# Patient Record
Sex: FEMALE | Race: WHITE | URBAN - METROPOLITAN AREA
[De-identification: names, ages, dates, MRNs, and addresses within clinical notes are randomized per-mention and may not be internally consistent; named-entity substitution may affect disease eponyms.]

---

## 2020-10-21 LAB
ABS NEUT (OLG): 7.48 X10(3)/MCL (ref 2.1–9.2)
ALBUMIN SERPL-MCNC: 2.7 GM/DL (ref 3.4–4.8)
ALBUMIN/GLOB SERPL: 1.1 RATIO (ref 1.1–2)
ALP SERPL-CCNC: 104 UNIT/L (ref 40–150)
ALT SERPL-CCNC: 108 UNIT/L (ref 0–55)
AST SERPL-CCNC: 54 UNIT/L (ref 5–34)
BASOPHILS # BLD AUTO: 0.01 X10(3)/MCL (ref 0–0.2)
BASOPHILS NFR BLD AUTO: 0.1 % (ref 0–1)
BILIRUB SERPL-MCNC: 2.6 MG/DL (ref 0.2–1.2)
BILIRUBIN DIRECT+TOT PNL SERPL-MCNC: 1 MG/DL (ref 0–0.8)
BILIRUBIN DIRECT+TOT PNL SERPL-MCNC: 1.6 MG/DL (ref 0–0.5)
BUN SERPL-MCNC: 78.7 MG/DL (ref 9.8–20.1)
CALCIUM SERPL-MCNC: 8.1 MG/DL (ref 8.4–10.2)
CHLORIDE SERPL-SCNC: 107 MMOL/L (ref 98–107)
CO2 SERPL-SCNC: 26 MMOL/L (ref 23–31)
CREAT SERPL-MCNC: 1.95 MG/DL (ref 0.57–1.11)
CRP SERPL HS-MCNC: 5.76 MG/L (ref 0–5)
EOSINOPHIL # BLD AUTO: 0.18 X10(3)/MCL (ref 0–0.9)
EOSINOPHIL NFR BLD AUTO: 2 % (ref 0–6.4)
ERYTHROCYTE [DISTWIDTH] IN BLOOD BY AUTOMATED COUNT: 17.8 % (ref 11.5–17)
ERYTHROCYTE [SEDIMENTATION RATE] IN BLOOD: 17 MM/HR (ref 0–30)
FERRITIN SERPL-MCNC: 120.33 NG/ML (ref 4.63–204)
GLOBULIN SER-MCNC: 2.4 GM/DL (ref 2.4–3.5)
GLUCOSE SERPL-MCNC: 195 MG/DL (ref 82–115)
HCT VFR BLD AUTO: 29.7 % (ref 37–47)
HGB BLD-MCNC: 9.4 GM/DL (ref 12–16)
IMM GRANULOCYTES # BLD AUTO: 0.15 10*3/UL (ref 0–0.02)
IMM GRANULOCYTES NFR BLD AUTO: 1.6 % (ref 0–0.43)
IRON SATN MFR SERPL: 12 % (ref 20–50)
IRON SERPL-MCNC: 40 UG/DL (ref 50–170)
LYMPHOCYTES # BLD AUTO: 0.55 X10(3)/MCL (ref 0.6–4.6)
LYMPHOCYTES NFR BLD AUTO: 6 % (ref 16–44)
MAGNESIUM SERPL-MCNC: 2.17 MG/DL (ref 1.6–2.6)
MCH RBC QN AUTO: 28.7 PG (ref 27–31)
MCHC RBC AUTO-ENTMCNC: 31.6 GM/DL (ref 33–36)
MCV RBC AUTO: 90.8 FL (ref 80–94)
MONOCYTES # BLD AUTO: 0.86 X10(3)/MCL (ref 0.1–1.3)
MONOCYTES NFR BLD AUTO: 9.3 % (ref 4–12.1)
NEUTROPHILS # BLD AUTO: 7.48 X10(3)/MCL (ref 2.1–9.2)
NEUTROPHILS NFR BLD AUTO: 81 % (ref 43–73)
NRBC BLD AUTO-RTO: 0 % (ref 0–0.2)
PHOSPHATE SERPL-MCNC: 3.3 MG/DL (ref 2.3–4.7)
PLATELET # BLD AUTO: 199 X10(3)/MCL (ref 130–400)
PMV BLD AUTO: 10.2 FL (ref 7.4–10.4)
POTASSIUM SERPL-SCNC: 3.9 MMOL/L (ref 3.5–5.1)
PREALB SERPL-MCNC: 16 MG/DL (ref 14–37)
PROT SERPL-MCNC: 5.1 GM/DL (ref 5.8–7.6)
RBC # BLD AUTO: 3.27 X10(6)/MCL (ref 4.2–5.4)
SODIUM SERPL-SCNC: 142 MMOL/L (ref 136–145)
TIBC SERPL-MCNC: 284 UG/DL (ref 70–310)
TIBC SERPL-MCNC: 324 UG/DL (ref 250–450)
TRANSFERRIN SERPL-MCNC: 297 MG/DL
WBC # SPEC AUTO: 9.2 X10(3)/MCL (ref 4.5–11.5)

## 2020-10-23 LAB
BUN SERPL-MCNC: 64.3 MG/DL (ref 9.8–20.1)
CALCIUM SERPL-MCNC: 8.7 MG/DL (ref 8.4–10.2)
CHLORIDE SERPL-SCNC: 107 MMOL/L (ref 98–107)
CO2 SERPL-SCNC: 29 MMOL/L (ref 23–31)
CREAT SERPL-MCNC: 1.52 MG/DL (ref 0.57–1.11)
CREAT/UREA NIT SERPL: 42
GLUCOSE SERPL-MCNC: 234 MG/DL (ref 82–115)
POTASSIUM SERPL-SCNC: 3.7 MMOL/L (ref 3.5–5.1)
SODIUM SERPL-SCNC: 145 MMOL/L (ref 136–145)

## 2020-10-25 LAB
BUN SERPL-MCNC: 53 MG/DL (ref 9.8–20.1)
CALCIUM SERPL-MCNC: 8.9 MG/DL (ref 8.4–10.2)
CHLORIDE SERPL-SCNC: 102 MMOL/L (ref 98–107)
CO2 SERPL-SCNC: 36 MMOL/L (ref 23–31)
CREAT SERPL-MCNC: 1.31 MG/DL (ref 0.57–1.11)
CREAT/UREA NIT SERPL: 40
EST. AVERAGE GLUCOSE BLD GHB EST-MCNC: 139.8 MG/DL
GLUCOSE SERPL-MCNC: 162 MG/DL (ref 82–115)
HBA1C MFR BLD: 6.5 %
MAGNESIUM SERPL-MCNC: 2.09 MG/DL (ref 1.6–2.6)
POTASSIUM SERPL-SCNC: 3.5 MMOL/L (ref 3.5–5.1)
SODIUM SERPL-SCNC: 147 MMOL/L (ref 136–145)

## 2020-10-26 LAB
ABS NEUT (OLG): 5.65 X10(3)/MCL (ref 2.1–9.2)
ALBUMIN SERPL-MCNC: 2.5 GM/DL (ref 3.4–4.8)
ALBUMIN/GLOB SERPL: 1 RATIO (ref 1.1–2)
ALP SERPL-CCNC: 206 UNIT/L (ref 40–150)
ALT SERPL-CCNC: 65 UNIT/L (ref 0–55)
AST SERPL-CCNC: 47 UNIT/L (ref 5–34)
BASOPHILS # BLD AUTO: 0.01 X10(3)/MCL (ref 0–0.2)
BASOPHILS NFR BLD AUTO: 0.1 % (ref 0–1)
BILIRUB SERPL-MCNC: 2.2 MG/DL (ref 0.2–1.2)
BILIRUBIN DIRECT+TOT PNL SERPL-MCNC: 0.9 MG/DL (ref 0–0.8)
BILIRUBIN DIRECT+TOT PNL SERPL-MCNC: 1.3 MG/DL (ref 0–0.5)
BUN SERPL-MCNC: 53.5 MG/DL (ref 9.8–20.1)
CALCIUM SERPL-MCNC: 8.7 MG/DL (ref 8.4–10.2)
CHLORIDE SERPL-SCNC: 102 MMOL/L (ref 98–107)
CO2 SERPL-SCNC: 34 MMOL/L (ref 23–31)
CREAT SERPL-MCNC: 1.23 MG/DL (ref 0.57–1.11)
CRP SERPL HS-MCNC: 5.74 MG/L (ref 0–5)
EOSINOPHIL # BLD AUTO: 0.18 X10(3)/MCL (ref 0–0.9)
EOSINOPHIL NFR BLD AUTO: 2.5 % (ref 0–6.4)
ERYTHROCYTE [DISTWIDTH] IN BLOOD BY AUTOMATED COUNT: 18.6 % (ref 11.5–17)
ERYTHROCYTE [SEDIMENTATION RATE] IN BLOOD: 22 MM/HR (ref 0–30)
GLOBULIN SER-MCNC: 2.6 GM/DL (ref 2.4–3.5)
GLUCOSE SERPL-MCNC: 172 MG/DL (ref 82–115)
HCT VFR BLD AUTO: 31.7 % (ref 37–47)
HGB BLD-MCNC: 9.7 GM/DL (ref 12–16)
IMM GRANULOCYTES # BLD AUTO: 0.13 10*3/UL (ref 0–0.02)
IMM GRANULOCYTES NFR BLD AUTO: 1.8 % (ref 0–0.43)
LYMPHOCYTES # BLD AUTO: 0.53 X10(3)/MCL (ref 0.6–4.6)
LYMPHOCYTES NFR BLD AUTO: 7.4 % (ref 16–44)
MAGNESIUM SERPL-MCNC: 2.01 MG/DL (ref 1.6–2.6)
MCH RBC QN AUTO: 29.2 PG (ref 27–31)
MCHC RBC AUTO-ENTMCNC: 30.6 GM/DL (ref 33–36)
MCV RBC AUTO: 95.5 FL (ref 80–94)
MONOCYTES # BLD AUTO: 0.69 X10(3)/MCL (ref 0.1–1.3)
MONOCYTES NFR BLD AUTO: 9.6 % (ref 4–12.1)
NEUTROPHILS # BLD AUTO: 5.65 X10(3)/MCL (ref 2.1–9.2)
NEUTROPHILS NFR BLD AUTO: 78.6 % (ref 43–73)
NRBC BLD AUTO-RTO: 0 % (ref 0–0.2)
PHOSPHATE SERPL-MCNC: 3.4 MG/DL (ref 2.3–4.7)
PLATELET # BLD AUTO: 263 X10(3)/MCL (ref 130–400)
PMV BLD AUTO: 11.2 FL (ref 7.4–10.4)
POTASSIUM SERPL-SCNC: 3.8 MMOL/L (ref 3.5–5.1)
PREALB SERPL-MCNC: 16.8 MG/DL (ref 14–37)
PROT SERPL-MCNC: 5.1 GM/DL (ref 5.8–7.6)
RBC # BLD AUTO: 3.32 X10(6)/MCL (ref 4.2–5.4)
SODIUM SERPL-SCNC: 145 MMOL/L (ref 136–145)
WBC # SPEC AUTO: 7.2 X10(3)/MCL (ref 4.5–11.5)

## 2020-10-27 LAB
BUN SERPL-MCNC: 50.3 MG/DL (ref 9.8–20.1)
CALCIUM SERPL-MCNC: 9 MG/DL (ref 8.4–10.2)
CHLORIDE SERPL-SCNC: 104 MMOL/L (ref 98–107)
CO2 SERPL-SCNC: 33 MMOL/L (ref 23–31)
CREAT SERPL-MCNC: 1.18 MG/DL (ref 0.57–1.11)
CREAT/UREA NIT SERPL: 43
GLUCOSE SERPL-MCNC: 176 MG/DL (ref 82–115)
POTASSIUM SERPL-SCNC: 4.3 MMOL/L (ref 3.5–5.1)
SODIUM SERPL-SCNC: 144 MMOL/L (ref 136–145)

## 2020-10-28 LAB
BUN SERPL-MCNC: 53.6 MG/DL (ref 9.8–20.1)
CALCIUM SERPL-MCNC: 9 MG/DL (ref 8.4–10.2)
CHLORIDE SERPL-SCNC: 103 MMOL/L (ref 98–107)
CO2 SERPL-SCNC: 31 MMOL/L (ref 23–31)
CREAT SERPL-MCNC: 1.31 MG/DL (ref 0.57–1.11)
CREAT/UREA NIT SERPL: 41
GLUCOSE SERPL-MCNC: 113 MG/DL (ref 82–115)
HCT VFR BLD AUTO: 33.3 % (ref 37–47)
HGB BLD-MCNC: 9.9 GM/DL (ref 12–16)
MAGNESIUM SERPL-MCNC: 2.15 MG/DL (ref 1.6–2.6)
PHOSPHATE SERPL-MCNC: 3.9 MG/DL (ref 2.3–4.7)
POTASSIUM SERPL-SCNC: 3.8 MMOL/L (ref 3.5–5.1)
SODIUM SERPL-SCNC: 143 MMOL/L (ref 136–145)

## 2020-10-29 LAB
ABS NEUT (OLG): 6.24 X10(3)/MCL (ref 2.1–9.2)
ANISOCYTOSIS BLD QL SMEAR: ABNORMAL
APPEARANCE, UA: ABNORMAL
BACTERIA SPEC CULT: ABNORMAL /HPF
BASOPHILS # BLD AUTO: 0.01 X10(3)/MCL (ref 0–0.2)
BASOPHILS NFR BLD AUTO: 0.1 % (ref 0–1)
BILIRUB UR QL STRIP: ABNORMAL
BUN SERPL-MCNC: 52.5 MG/DL (ref 9.8–20.1)
CALCIUM SERPL-MCNC: 8.9 MG/DL (ref 8.4–10.2)
CHLORIDE SERPL-SCNC: 102 MMOL/L (ref 98–107)
CO2 SERPL-SCNC: 33 MMOL/L (ref 23–31)
COLOR UR: ABNORMAL
CREAT SERPL-MCNC: 1.45 MG/DL (ref 0.57–1.11)
CREAT/UREA NIT SERPL: 36
EOSINOPHIL # BLD AUTO: 0.07 X10(3)/MCL (ref 0–0.9)
EOSINOPHIL NFR BLD AUTO: 0.9 % (ref 0–6.4)
ERYTHROCYTE [DISTWIDTH] IN BLOOD BY AUTOMATED COUNT: 18.8 % (ref 11.5–17)
GLUCOSE (UA): NEGATIVE
GLUCOSE SERPL-MCNC: 202 MG/DL (ref 82–115)
HCT VFR BLD AUTO: 30.9 % (ref 37–47)
HCT VFR BLD AUTO: 31.7 % (ref 37–47)
HGB BLD-MCNC: 9.3 GM/DL (ref 12–16)
HGB BLD-MCNC: 9.4 GM/DL (ref 12–16)
HGB UR QL STRIP: ABNORMAL
HYPOCHROMIA BLD QL SMEAR: ABNORMAL
IMM GRANULOCYTES # BLD AUTO: 0.15 10*3/UL (ref 0–0.02)
IMM GRANULOCYTES NFR BLD AUTO: 1.9 % (ref 0–0.43)
KETONES UR QL STRIP: NEGATIVE
LACTATE SERPL-SCNC: 1.9 MMOL/L (ref 0.5–2.2)
LEUKOCYTE ESTERASE UR QL STRIP: NEGATIVE
LYMPHOCYTES # BLD AUTO: 0.59 X10(3)/MCL (ref 0.6–4.6)
LYMPHOCYTES NFR BLD AUTO: 7.6 % (ref 16–44)
MACROCYTES BLD QL SMEAR: SLIGHT
MCH RBC QN AUTO: 28.9 PG (ref 27–31)
MCHC RBC AUTO-ENTMCNC: 29.7 GM/DL (ref 33–36)
MCV RBC AUTO: 97.5 FL (ref 80–94)
MICROCYTES BLD QL SMEAR: SLIGHT
MONOCYTES # BLD AUTO: 0.72 X10(3)/MCL (ref 0.1–1.3)
MONOCYTES NFR BLD AUTO: 9.3 % (ref 4–12.1)
NEUTROPHILS # BLD AUTO: 6.24 X10(3)/MCL (ref 2.1–9.2)
NEUTROPHILS NFR BLD AUTO: 80.2 % (ref 43–73)
NITRITE UR QL STRIP: NEGATIVE
NRBC BLD AUTO-RTO: 0 % (ref 0–0.2)
PH UR STRIP: 8 [PH] (ref 5–7)
PLATELET # BLD AUTO: 250 X10(3)/MCL (ref 130–400)
PLATELET # BLD EST: ADEQUATE 10*3/UL
PMV BLD AUTO: 11.3 FL (ref 7.4–10.4)
POTASSIUM SERPL-SCNC: 3.8 MMOL/L (ref 3.5–5.1)
PROT UR QL STRIP: ABNORMAL
RBC # BLD AUTO: 3.25 X10(6)/MCL (ref 4.2–5.4)
RBC #/AREA URNS HPF: ABNORMAL /HPF
RBC MORPH BLD: ABNORMAL
SODIUM SERPL-SCNC: 143 MMOL/L (ref 136–145)
SP GR UR STRIP: 1.01 (ref 1–1.03)
SQUAMOUS EPITHELIAL, UA: ABNORMAL /LPF
UROBILINOGEN UR STRIP-ACNC: ABNORMAL
WBC # SPEC AUTO: 7.8 X10(3)/MCL (ref 4.5–11.5)
WBC #/AREA URNS HPF: ABNORMAL /HPF

## 2020-10-30 LAB
ABS NEUT (OLG): 6.62 X10(3)/MCL (ref 2.1–9.2)
BASOPHILS # BLD AUTO: 0.02 X10(3)/MCL (ref 0–0.2)
BASOPHILS NFR BLD AUTO: 0.2 % (ref 0–1)
BUN SERPL-MCNC: 51.1 MG/DL (ref 9.8–20.1)
CALCIUM SERPL-MCNC: 9.1 MG/DL (ref 8.4–10.2)
CHLORIDE SERPL-SCNC: 101 MMOL/L (ref 98–107)
CO2 SERPL-SCNC: 33 MMOL/L (ref 23–31)
CREAT SERPL-MCNC: 1.39 MG/DL (ref 0.57–1.11)
CREAT/UREA NIT SERPL: 37
EOSINOPHIL # BLD AUTO: 0.06 X10(3)/MCL (ref 0–0.9)
EOSINOPHIL NFR BLD AUTO: 0.7 % (ref 0–6.4)
ERYTHROCYTE [DISTWIDTH] IN BLOOD BY AUTOMATED COUNT: 19.1 % (ref 11.5–17)
GLUCOSE SERPL-MCNC: 180 MG/DL (ref 82–115)
HCT VFR BLD AUTO: 32 % (ref 37–47)
HGB BLD-MCNC: 9.6 GM/DL (ref 12–16)
IMM GRANULOCYTES # BLD AUTO: 0.15 10*3/UL (ref 0–0.02)
IMM GRANULOCYTES NFR BLD AUTO: 1.9 % (ref 0–0.43)
LYMPHOCYTES # BLD AUTO: 0.47 X10(3)/MCL (ref 0.6–4.6)
LYMPHOCYTES NFR BLD AUTO: 5.8 % (ref 16–44)
MCH RBC QN AUTO: 28.9 PG (ref 27–31)
MCHC RBC AUTO-ENTMCNC: 30 GM/DL (ref 33–36)
MCV RBC AUTO: 96.4 FL (ref 80–94)
MONOCYTES # BLD AUTO: 0.77 X10(3)/MCL (ref 0.1–1.3)
MONOCYTES NFR BLD AUTO: 9.5 % (ref 4–12.1)
NEUTROPHILS # BLD AUTO: 6.62 X10(3)/MCL (ref 2.1–9.2)
NEUTROPHILS NFR BLD AUTO: 81.9 % (ref 43–73)
NRBC BLD AUTO-RTO: 0 % (ref 0–0.2)
PLATELET # BLD AUTO: 240 X10(3)/MCL (ref 130–400)
PMV BLD AUTO: 11.1 FL (ref 7.4–10.4)
POTASSIUM SERPL-SCNC: 3.6 MMOL/L (ref 3.5–5.1)
RBC # BLD AUTO: 3.32 X10(6)/MCL (ref 4.2–5.4)
SODIUM SERPL-SCNC: 142 MMOL/L (ref 136–145)
WBC # SPEC AUTO: 8.1 X10(3)/MCL (ref 4.5–11.5)

## 2020-10-31 LAB
ABS NEUT (OLG): 5.57 X10(3)/MCL (ref 2.1–9.2)
ALBUMIN SERPL-MCNC: 2.7 GM/DL (ref 3.4–4.8)
ALBUMIN/GLOB SERPL: 1 RATIO (ref 1.1–2)
ALP SERPL-CCNC: 285 UNIT/L (ref 40–150)
ALT SERPL-CCNC: 57 UNIT/L (ref 0–55)
AST SERPL-CCNC: 41 UNIT/L (ref 5–34)
BASOPHILS # BLD AUTO: 0.02 X10(3)/MCL (ref 0–0.2)
BASOPHILS NFR BLD AUTO: 0.3 % (ref 0–1)
BILIRUB SERPL-MCNC: 2.1 MG/DL (ref 0.2–1.2)
BILIRUBIN DIRECT+TOT PNL SERPL-MCNC: 0.8 MG/DL (ref 0–0.8)
BILIRUBIN DIRECT+TOT PNL SERPL-MCNC: 1.3 MG/DL (ref 0–0.5)
BUN SERPL-MCNC: 49.2 MG/DL (ref 9.8–20.1)
C DIFF INTERP: ABNORMAL
CALCIUM SERPL-MCNC: 8.6 MG/DL (ref 8.4–10.2)
CHLORIDE SERPL-SCNC: 102 MMOL/L (ref 98–107)
CO2 SERPL-SCNC: 28 MMOL/L (ref 23–31)
CREAT SERPL-MCNC: 1.34 MG/DL (ref 0.57–1.11)
EOSINOPHIL # BLD AUTO: 0.1 X10(3)/MCL (ref 0–0.9)
EOSINOPHIL NFR BLD AUTO: 1.4 % (ref 0–6.4)
ERYTHROCYTE [DISTWIDTH] IN BLOOD BY AUTOMATED COUNT: 19.4 % (ref 11.5–17)
GLOBULIN SER-MCNC: 2.8 GM/DL (ref 2.4–3.5)
GLUCOSE SERPL-MCNC: 218 MG/DL (ref 82–115)
HCT VFR BLD AUTO: 31.6 % (ref 37–47)
HGB BLD-MCNC: 9.6 GM/DL (ref 12–16)
IMM GRANULOCYTES # BLD AUTO: 0.12 10*3/UL (ref 0–0.02)
IMM GRANULOCYTES NFR BLD AUTO: 1.7 % (ref 0–0.43)
LYMPHOCYTES # BLD AUTO: 0.53 X10(3)/MCL (ref 0.6–4.6)
LYMPHOCYTES NFR BLD AUTO: 7.4 % (ref 16–44)
MAGNESIUM SERPL-MCNC: 2.11 MG/DL (ref 1.6–2.6)
MCH RBC QN AUTO: 29.3 PG (ref 27–31)
MCHC RBC AUTO-ENTMCNC: 30.4 GM/DL (ref 33–36)
MCV RBC AUTO: 96.3 FL (ref 80–94)
MONOCYTES # BLD AUTO: 0.8 X10(3)/MCL (ref 0.1–1.3)
MONOCYTES NFR BLD AUTO: 11.2 % (ref 4–12.1)
NEUTROPHILS # BLD AUTO: 5.57 X10(3)/MCL (ref 2.1–9.2)
NEUTROPHILS NFR BLD AUTO: 78 % (ref 43–73)
NRBC BLD AUTO-RTO: 0.3 % (ref 0–0.2)
PHOSPHATE SERPL-MCNC: 3.9 MG/DL (ref 2.3–4.7)
PLATELET # BLD AUTO: 234 X10(3)/MCL (ref 130–400)
PMV BLD AUTO: 11.4 FL (ref 7.4–10.4)
POTASSIUM SERPL-SCNC: 3.7 MMOL/L (ref 3.5–5.1)
PROT SERPL-MCNC: 5.5 GM/DL (ref 5.8–7.6)
RBC # BLD AUTO: 3.28 X10(6)/MCL (ref 4.2–5.4)
SODIUM SERPL-SCNC: 140 MMOL/L (ref 136–145)
WBC # SPEC AUTO: 7.1 X10(3)/MCL (ref 4.5–11.5)

## 2020-11-02 ENCOUNTER — HISTORICAL (OUTPATIENT)
Dept: ADMINISTRATIVE | Facility: HOSPITAL | Age: 85
End: 2020-11-02

## 2020-11-02 LAB
ABS NEUT (OLG): 5.05 X10(3)/MCL (ref 2.1–9.2)
ALBUMIN SERPL-MCNC: 2.7 GM/DL (ref 3.4–4.8)
ALBUMIN/GLOB SERPL: 1 RATIO (ref 1.1–2)
ALP SERPL-CCNC: 287 UNIT/L (ref 40–150)
ALT SERPL-CCNC: 50 UNIT/L (ref 0–55)
AST SERPL-CCNC: 36 UNIT/L (ref 5–34)
BASOPHILS # BLD AUTO: 0.02 X10(3)/MCL (ref 0–0.2)
BASOPHILS NFR BLD AUTO: 0.3 % (ref 0–1)
BILIRUB SERPL-MCNC: 1.7 MG/DL (ref 0.2–1.2)
BILIRUBIN DIRECT+TOT PNL SERPL-MCNC: 0.7 MG/DL (ref 0–0.8)
BILIRUBIN DIRECT+TOT PNL SERPL-MCNC: 1 MG/DL (ref 0–0.5)
BUN SERPL-MCNC: 42.4 MG/DL (ref 9.8–20.1)
CALCIUM SERPL-MCNC: 8.5 MG/DL (ref 8.4–10.2)
CHLORIDE SERPL-SCNC: 100 MMOL/L (ref 98–107)
CO2 SERPL-SCNC: 29 MMOL/L (ref 23–31)
CREAT SERPL-MCNC: 1.29 MG/DL (ref 0.57–1.11)
CRP SERPL HS-MCNC: 3.73 MG/L (ref 0–5)
EOSINOPHIL # BLD AUTO: 0.15 X10(3)/MCL (ref 0–0.9)
EOSINOPHIL NFR BLD AUTO: 2.3 % (ref 0–6.4)
ERYTHROCYTE [DISTWIDTH] IN BLOOD BY AUTOMATED COUNT: 19.7 % (ref 11.5–17)
ERYTHROCYTE [SEDIMENTATION RATE] IN BLOOD: 7 MM/HR (ref 0–30)
GLOBULIN SER-MCNC: 2.7 GM/DL (ref 2.4–3.5)
GLUCOSE SERPL-MCNC: 173 MG/DL (ref 82–115)
HCT VFR BLD AUTO: 33.1 % (ref 37–47)
HGB BLD-MCNC: 10.1 GM/DL (ref 12–16)
IMM GRANULOCYTES # BLD AUTO: 0.14 10*3/UL (ref 0–0.02)
IMM GRANULOCYTES NFR BLD AUTO: 2.1 % (ref 0–0.43)
LYMPHOCYTES # BLD AUTO: 0.5 X10(3)/MCL (ref 0.6–4.6)
LYMPHOCYTES NFR BLD AUTO: 7.6 % (ref 16–44)
MAGNESIUM SERPL-MCNC: 1.95 MG/DL (ref 1.6–2.6)
MCH RBC QN AUTO: 29.4 PG (ref 27–31)
MCHC RBC AUTO-ENTMCNC: 30.5 GM/DL (ref 33–36)
MCV RBC AUTO: 96.5 FL (ref 80–94)
MONOCYTES # BLD AUTO: 0.75 X10(3)/MCL (ref 0.1–1.3)
MONOCYTES NFR BLD AUTO: 11.3 % (ref 4–12.1)
NEUTROPHILS # BLD AUTO: 5.05 X10(3)/MCL (ref 2.1–9.2)
NEUTROPHILS NFR BLD AUTO: 76.4 % (ref 43–73)
NRBC BLD AUTO-RTO: 0.3 % (ref 0–0.2)
PHOSPHATE SERPL-MCNC: 3.7 MG/DL (ref 2.3–4.7)
PLATELET # BLD AUTO: 236 X10(3)/MCL (ref 130–400)
PMV BLD AUTO: 11.4 FL (ref 7.4–10.4)
POTASSIUM SERPL-SCNC: 3.8 MMOL/L (ref 3.5–5.1)
PREALB SERPL-MCNC: 16.8 MG/DL (ref 14–37)
PROT SERPL-MCNC: 5.4 GM/DL (ref 5.8–7.6)
RBC # BLD AUTO: 3.43 X10(6)/MCL (ref 4.2–5.4)
SODIUM SERPL-SCNC: 137 MMOL/L (ref 136–145)
VANCOMYCIN TROUGH SERPL-MCNC: 14.1 UG/ML (ref 5–10)
WBC # SPEC AUTO: 6.6 X10(3)/MCL (ref 4.5–11.5)

## 2020-11-03 LAB — FINAL CULTURE: NORMAL

## 2020-11-04 LAB
BUN SERPL-MCNC: 37.1 MG/DL (ref 9.8–20.1)
CALCIUM SERPL-MCNC: 8.6 MG/DL (ref 8.4–10.2)
CHLORIDE SERPL-SCNC: 100 MMOL/L (ref 98–107)
CO2 SERPL-SCNC: 31 MMOL/L (ref 23–31)
CREAT SERPL-MCNC: 1.23 MG/DL (ref 0.57–1.11)
CREAT/UREA NIT SERPL: 30
GLUCOSE SERPL-MCNC: 144 MG/DL (ref 82–115)
HCT VFR BLD AUTO: 33.7 % (ref 37–47)
HGB BLD-MCNC: 10.1 GM/DL (ref 12–16)
POTASSIUM SERPL-SCNC: 3.8 MMOL/L (ref 3.5–5.1)
SODIUM SERPL-SCNC: 138 MMOL/L (ref 136–145)

## 2020-11-05 ENCOUNTER — HISTORICAL (OUTPATIENT)
Dept: SURGERY | Facility: HOSPITAL | Age: 85
End: 2020-11-05

## 2020-11-07 LAB
ABS NEUT (OLG): 4.9 X10(3)/MCL (ref 2.1–9.2)
BASOPHILS # BLD AUTO: 0.02 X10(3)/MCL (ref 0–0.2)
BASOPHILS NFR BLD AUTO: 0.3 % (ref 0–1)
BUN SERPL-MCNC: 38.3 MG/DL (ref 9.8–20.1)
CALCIUM SERPL-MCNC: 8.6 MG/DL (ref 8.4–10.2)
CHLORIDE SERPL-SCNC: 105 MMOL/L (ref 98–107)
CO2 SERPL-SCNC: 23 MMOL/L (ref 23–31)
CREAT SERPL-MCNC: 1.37 MG/DL (ref 0.57–1.11)
CREAT/UREA NIT SERPL: 28
EOSINOPHIL # BLD AUTO: 0.09 X10(3)/MCL (ref 0–0.9)
EOSINOPHIL NFR BLD AUTO: 1.4 % (ref 0–6.4)
ERYTHROCYTE [DISTWIDTH] IN BLOOD BY AUTOMATED COUNT: 20.8 % (ref 11.5–17)
GLUCOSE SERPL-MCNC: 133 MG/DL (ref 82–115)
HCT VFR BLD AUTO: 37.9 % (ref 37–47)
HGB BLD-MCNC: 11.4 GM/DL (ref 12–16)
IMM GRANULOCYTES # BLD AUTO: 0.12 10*3/UL (ref 0–0.02)
IMM GRANULOCYTES NFR BLD AUTO: 1.9 % (ref 0–0.43)
LYMPHOCYTES # BLD AUTO: 0.63 X10(3)/MCL (ref 0.6–4.6)
LYMPHOCYTES NFR BLD AUTO: 9.7 % (ref 16–44)
MAGNESIUM SERPL-MCNC: 2.03 MG/DL (ref 1.6–2.6)
MCH RBC QN AUTO: 30 PG (ref 27–31)
MCHC RBC AUTO-ENTMCNC: 30.1 GM/DL (ref 33–36)
MCV RBC AUTO: 99.7 FL (ref 80–94)
MONOCYTES # BLD AUTO: 0.71 X10(3)/MCL (ref 0.1–1.3)
MONOCYTES NFR BLD AUTO: 11 % (ref 4–12.1)
NEUTROPHILS # BLD AUTO: 4.9 X10(3)/MCL (ref 2.1–9.2)
NEUTROPHILS NFR BLD AUTO: 75.7 % (ref 43–73)
NRBC BLD AUTO-RTO: 1.1 % (ref 0–0.2)
PLATELET # BLD AUTO: 217 X10(3)/MCL (ref 130–400)
PMV BLD AUTO: 10.7 FL (ref 7.4–10.4)
POTASSIUM SERPL-SCNC: 4.1 MMOL/L (ref 3.5–5.1)
RBC # BLD AUTO: 3.8 X10(6)/MCL (ref 4.2–5.4)
SODIUM SERPL-SCNC: 139 MMOL/L (ref 136–145)
WBC # SPEC AUTO: 6.5 X10(3)/MCL (ref 4.5–11.5)

## 2020-11-08 LAB
APPEARANCE, UA: ABNORMAL
BACTERIA SPEC CULT: ABNORMAL /HPF
BILIRUB UR QL STRIP: NEGATIVE
COLOR UR: ABNORMAL
FINAL CULTURE: NORMAL
FINAL CULTURE: NORMAL
GLUCOSE (UA): NEGATIVE
HGB UR QL STRIP: ABNORMAL
KETONES UR QL STRIP: NEGATIVE
LEUKOCYTE ESTERASE UR QL STRIP: ABNORMAL
NITRITE UR QL STRIP: NEGATIVE
PH UR STRIP: 5.5 [PH] (ref 5–7)
PROT UR QL STRIP: NEGATIVE
RBC #/AREA URNS HPF: ABNORMAL /HPF
SP GR UR STRIP: 1.02 (ref 1–1.03)
SQUAMOUS EPITHELIAL, UA: ABNORMAL /LPF
UROBILINOGEN UR STRIP-ACNC: NEGATIVE
WBC #/AREA URNS HPF: ABNORMAL /HPF

## 2020-11-09 LAB
ABS NEUT (OLG): 6.32 X10(3)/MCL (ref 2.1–9.2)
ALBUMIN SERPL-MCNC: 2.4 GM/DL (ref 3.4–4.8)
ALBUMIN/GLOB SERPL: 1 RATIO (ref 1.1–2)
ALP SERPL-CCNC: 227 UNIT/L (ref 40–150)
ALT SERPL-CCNC: 29 UNIT/L (ref 0–55)
ANISOCYTOSIS BLD QL SMEAR: ABNORMAL
AST SERPL-CCNC: 28 UNIT/L (ref 5–34)
BASOPHILS # BLD AUTO: 0.02 X10(3)/MCL (ref 0–0.2)
BASOPHILS NFR BLD AUTO: 0.3 % (ref 0–1)
BILIRUB SERPL-MCNC: 1.1 MG/DL (ref 0.2–1.2)
BILIRUBIN DIRECT+TOT PNL SERPL-MCNC: 0.4 MG/DL (ref 0–0.8)
BILIRUBIN DIRECT+TOT PNL SERPL-MCNC: 0.7 MG/DL (ref 0–0.5)
BUN SERPL-MCNC: 44.3 MG/DL (ref 9.8–20.1)
CALCIUM SERPL-MCNC: 8.4 MG/DL (ref 8.4–10.2)
CHLORIDE SERPL-SCNC: 103 MMOL/L (ref 98–107)
CO2 SERPL-SCNC: 29 MMOL/L (ref 23–31)
CREAT SERPL-MCNC: 1.66 MG/DL (ref 0.57–1.11)
CRP SERPL HS-MCNC: 4.96 MG/L (ref 0–5)
EOSINOPHIL # BLD AUTO: 0.18 X10(3)/MCL (ref 0–0.9)
EOSINOPHIL NFR BLD AUTO: 2.3 % (ref 0–6.4)
ERYTHROCYTE [DISTWIDTH] IN BLOOD BY AUTOMATED COUNT: 21.1 % (ref 11.5–17)
ERYTHROCYTE [SEDIMENTATION RATE] IN BLOOD: 6 MM/HR (ref 0–30)
GLOBULIN SER-MCNC: 2.4 GM/DL (ref 2.4–3.5)
GLUCOSE SERPL-MCNC: 138 MG/DL (ref 82–115)
HCO3 UR-SCNC: 33.4 MMOL/L (ref 22–26)
HCO3 UR-SCNC: 35.2 MMOL/L (ref 22–26)
HCO3 UR-SCNC: 35.8 MMOL/L (ref 22–26)
HCT VFR BLD AUTO: 38.1 % (ref 37–47)
HGB BLD-MCNC: 11.1 GM/DL (ref 12–16)
HYPOCHROMIA BLD QL SMEAR: ABNORMAL
IMM GRANULOCYTES # BLD AUTO: 0.09 10*3/UL (ref 0–0.02)
IMM GRANULOCYTES NFR BLD AUTO: 1.2 % (ref 0–0.43)
LACTATE SERPL-SCNC: 1.7 MMOL/L (ref 0.5–2.2)
LYMPHOCYTES # BLD AUTO: 0.51 X10(3)/MCL (ref 0.6–4.6)
LYMPHOCYTES NFR BLD AUTO: 6.6 % (ref 16–44)
MACROCYTES BLD QL SMEAR: ABNORMAL
MAGNESIUM SERPL-MCNC: 2.19 MG/DL (ref 1.6–2.6)
MCH RBC QN AUTO: 29.4 PG (ref 27–31)
MCHC RBC AUTO-ENTMCNC: 29.1 GM/DL (ref 33–36)
MCV RBC AUTO: 101.1 FL (ref 80–94)
MONOCYTES # BLD AUTO: 0.66 X10(3)/MCL (ref 0.1–1.3)
MONOCYTES NFR BLD AUTO: 8.5 % (ref 4–12.1)
NEUTROPHILS # BLD AUTO: 6.32 X10(3)/MCL (ref 2.1–9.2)
NEUTROPHILS NFR BLD AUTO: 81.1 % (ref 43–73)
NRBC BLD AUTO-RTO: 0.8 % (ref 0–0.2)
PCO2 BLDA: 110 MMHG (ref 35–45)
PCO2 BLDA: 84 MMHG (ref 35–45)
PCO2 BLDA: 96 MMHG (ref 35–45)
PH SMN: 7.12 [PH] (ref 7.35–7.45)
PH SMN: 7.15 [PH] (ref 7.35–7.45)
PH SMN: 7.23 [PH] (ref 7.35–7.45)
PHOSPHATE SERPL-MCNC: 4.8 MG/DL (ref 2.3–4.7)
PLATELET # BLD AUTO: 208 X10(3)/MCL (ref 130–400)
PLATELET # BLD EST: ADEQUATE 10*3/UL
PMV BLD AUTO: 11 FL (ref 7.4–10.4)
PO2 BLDA: 101 MMHG (ref 50–100)
PO2 BLDA: 165 MMHG (ref 50–100)
PO2 BLDA: 228 MMHG (ref 50–100)
POC BE: 1.9 (ref -2–3)
POC BE: 3.2 (ref -2–3)
POC BE: 5 (ref -2–3)
POC SATURATED O2: 100 % (ref 96–97)
POC SATURATED O2: 96 % (ref 96–97)
POC SATURATED O2: 99 % (ref 96–97)
POTASSIUM SERPL-SCNC: 4.1 MMOL/L (ref 3.5–5.1)
PREALB SERPL-MCNC: 13.8 MG/DL (ref 14–37)
PROT SERPL-MCNC: 4.8 GM/DL (ref 5.8–7.6)
RBC # BLD AUTO: 3.77 X10(6)/MCL (ref 4.2–5.4)
RBC MORPH BLD: ABNORMAL
SODIUM SERPL-SCNC: 140 MMOL/L (ref 136–145)
WBC # SPEC AUTO: 7.8 X10(3)/MCL (ref 4.5–11.5)

## 2022-05-01 NOTE — DISCHARGE SUMMARY
Patient:   Richa Guerrero             MRN: [id number]            FIN: [id number]               Age:   85 years     Sex:  Female     :  1935   Associated Diagnoses:   None   Author:   Jerod Armstrong      Date of Service: 11/10/2020      Discharge Information      Discharge Summary Information   Date of Admission: 10/20/2020  Date of Discharge: 11/10/2020  Admit Diagnosis: Abdominal wall hematoma         ATN/CKD stage III         HTN         Klebsiella UTI         Normocytic anemia         Atrial fib         Oropharyngeal dysphagia         Metabolic acidosis         Constipation         Delirium         GERD  Discharge Diagnosis: Abdominal wall hematoma (stable)            ATN/CKD stage III (improved, stable)           HTN (stable)            Normocytic anemia (stable)            Atrial fib (stable)            Oropharyngeal dysphagia (current)           Constipation (stable)            GERD (stable)            Acute hypercapnic respiratory failure (stable, current)           Sepsis (current)           Asymptomatic bacteriuria (current)           Sacral decubitus stage II (stable)            Anasarca (current)           Bacteremia (resolved)           Hematuria (resolved)           Urinary retention (current)           Encephalopathy (current)  Internal Medicine (attending): Maurilio Bedoya MD    OUTPATIENT PROVIDERS  PCP: George Tellez MD  Cardiology: Nomi Muñoz MD      Hospital Course   85-year-old white female presented to [facility] ED on 10/13/2020 via air med after being transferred to [facility] as a level trauma 1 due to a trip and fall with head injury on Xarelto with hypotension on arrival.  She reported headaches and dizziness, but denied loss of consciousness, vomiting, or blurry vision.  Reportedly received 500 mL PRBC in route to ED.  PMH significant for DM type II, HTN, and atrial fib on Xarelto.  CT head unremarkable.  CT maxillofacial unremarkable for  acute fractures.  She did have a right periorbital hematoma.  CT abdomen/pelvis/thorax significant for a large anterior abdominal hematomaconsistent for intramuscular hematomas. Significant for bilateral pleural effusions.  Tolerated 1 unit PRBC in ED and transferred to ICU per trauma protocol.  No evidence of splenic laceration.  Bleeding appeared to stop and clear liquid diet initiated on 10/14.  Reported confusion and delirium on 10/14.  Staff reported she pulled out 2 IVs and was placed in restraints.  Reported only 81 mL urine output over 24-hour period.  1 L IVF initiated.  Cipro initiated on 10/14.  Urine culture grew out Klebsiella pneumoniae greater than 100,000 CFU on 10/15.  Cystogram unremarkable for bladder abnormalities.  Renal indices continue to worsen.  Creatinine 3.1 on 10/16.  Renal indices and urine improved due to fluid resuscitation.  Fluids initiated via NG tube feedings on 10/17.  Urine output continues to improve.  Sodium bicarb 1300 mg 3 times daily initiated 2/2 metabolic acidosis.  Xarelto restarted on 10/18.  MBS completed on 10/19 significant for a small Zenker's diverticulum.  ENT recommended continued speech therapy and safe for oral intake if she continues to progress.  Renal indices continue to improve.  Completed Cipro on 10/20.  Tolerated transfer to [facility] ([facility] inpatient LTAC on 10/20 without incident.    During inpatient LTAC course patient sleeping throughout the day.  Trazodone 25 mg at bedtime initiated for sleep hygiene and delirium.  Sodium bicarb decreased 1300 mg twice daily on 10/21.  Renal indices continue to improve.  Lasix drip initiated on 10/23 secondary to diffuse bilateral lower extremity swelling.  Serum bicarbonate discontinued on 10/25.  Januvia 50 mg daily initiated 10/25 for DM type II.  Renal indices continue to improve.  Diamox 500 mg x 1 dose on 10/26 with an improvement of bilateral lower extremity swelling.  Still remain  drowsy during the day.  Provigil 100 mg every morning initiated on 10/27.  Trazodone increased to 50 mg at bedtime to help with sleep hygiene.  On 10/29 remain lethargic.  CXR with increased left retrocardiac density.  CT unremarkable.  UA obtained and Zosyn initiated.  Urine culture and blood culture significant for Staph epidermidis.  Vancomycin initiated.  She also had hematuria which improved with CBI.  Unable to pass swallow study with speech therapy.  Tolerated PEG placement on 11/5 without incident.  Indwelling catheter discontinued on 11/6 with bladder scan every 4 hours x24 hours to monitor for urinary retention.  Coreg decreased to 3.125 mg twice daily on 11/8 due to BP on the low side.  Trazodone discontinued and Provigil 200 mg increased on 11/8.  On 11/8 NP and nurse got patient out of bed in chair.  Still drowsy but more responsive than previous days.  Still with diffuse bilateral upper and lower extremity swelling.  Nephrology recommended initiating torsemide 20 mg daily.  Staff reported mild bladder retention requiring in and out catheterization.  Early a.m. on 11/9 staff reported acute hypotension, lethargy.  Stat labs and ABGs obtained.  Lab work overall unremarkable with mild increase in renal indices.  ABGs significant for respiratory acidosis with hypercapnic respiratory failure.  BiPAP initiated 4 A bicarb given.  ABGs and cognition slightly improved.  Required Levophed to keep map above 65.  Responded well to Levophed and BP remained stable.  Family meeting held on 11/9 and family requested to discharge home with hospice as soon as possible.  They understand although improving respiratory status she has had a slow decline over the last month.  Requested comfort care.  No labs obtained.  BP stable.  Midodrine 5 mg 3 times daily initiated.  Discontinue Levophed.  Continues on BiPAP prior to transfer home with hospice.  Med reconciliation completed.  Discharge orders initiated.  Stable for  transfer home with hospice.  To follow-up with hospice physician upon admission home.    Chief Complaint: Large anterior abdominal hematoma s/p fall on Xarelto at home with hospital course complicated by RADHA on CKD requiring aggressive IVF resuscitation       Physical Examination      Vital Signs (last 24 hrs)_____  Last Charted___________  Temp Oral         L 35.7DegC  (NOV 10 00:00)  Resp Rate             20 br/min  (NOV 10 09:00)  SBP      96 mmHg  (NOV 10 06:00)  DBP      L 59mmHg  (NOV 10 06:00)  SpO2      100 %  (NOV 10 09:00)     General:  No acute distress, Lethargicresponds to pain stimuli.    Eye:  Vision unchanged, Right periorbital hematoma.    HENT:  Normocephalic.    Neck:  Supple.    Respiratory:  Lungs are clear to auscultation, Respirations are non-labored, Breath sounds are equal, Symmetrical chest wall expansion, No chest wall tenderness.    Cardiovascular:  Normal rate, Regular rhythm, No murmur, Normal peripheral perfusion, Trace bilateral lower extremity edema.    Gastrointestinal:  Soft, Non-tender, Normal bowel sounds, Obese, PEG tube intact.    Musculoskeletal:  Generalized weakness.    Integumentary:  Warm, Dry, Pressure related injury   Sacrum - Incision, Wound Pressure Ulcer Stage: II.         Skin phototype: Scattered ecchymosis.    Neurologic:  Normal sensory, Normal motor function, No focal deficits, Cranial Nerves II-XII are grossly intact.    Cognition and Speech:  Lethargic.      Results Review   General results   Most recent results   Discrete results only   11/9/2020 7:41 CST       pH Art                    7.230  <LLOW                             pCO2 Art                  84.0 mmHg  >HHI                             pO2 Art                   228 mmHg  HI                             HCO3 Art                  35.2 mmol/L  HI                             O2 Sat Art                100 %  HI                             D base                    5.0  HI                             FIO2  ABG                  50 %                             O2 Dev/Mode ABG           BIPAP 15/8                             Resp Rate ABG             16    11/9/2020 3:42 CST       WBC                       7.8 x10(3)/mcL                             RBC                       3.77 x10(6)/mcL  LOW                             Hgb                       11.1 gm/dL  LOW                             Hct                       38.1 %                             Platelet                  208 x10(3)/mcL                             MCV                       101.1 fL  HI                             MCH                       29.4 pg                             MCHC                      29.1 gm/dL  LOW                             RDW                       21.1 %  HI                             MPV                       11.0 fL  HI                             Abs Neut                  6.32 x10(3)/mcL                             Neutro Auto               81.1 %  HI                             Lymph Auto                6.6 %  LOW                             Mono Auto                 8.5 %                             Eos Auto                  2.3 %                             Abs Eos                   0.18 x10(3)/mcL                             Basophil Auto             0.3 %                             Abs Neutro                6.32 x10(3)/mcL                             Abs Lymph                 0.51 x10(3)/mcL  LOW                             Abs Mono                  0.66 x10(3)/mcL                             Abs Baso                  0.02 x10(3)/mcL                             NRBC%                     0.8 %                             IG%                       1.200 %  HI                             IG#                       0.0900  HI                             Hypochrom                 2+                             Platelet Est              Adequate                             Anisocyte                 2+                              Macrocyte                 1+                             RBC Morph                 Abnormal                             Sed Rate                  6 mm/hr                             Sodium Lvl                140 mmol/L                             Potassium Lvl             4.1 mmol/L                             Chloride                  103 mmol/L                             CO2                       29 mmol/L                             Calcium Lvl               8.4 mg/dL                             Magnesium Lvl             2.19 mg/dL                             Glucose Lvl               138 mg/dL  HI                             BUN                       44.3 mg/dL  HI                             Creatinine                1.66 mg/dL  HI                             eGFR-AA                   38  NA                             eGFR-CLIFF                  31 mL/min/1.73 m2  NA                             Bili Total                1.1 mg/dL                             Bili Direct               0.7 mg/dL  HI                             Bili Indirect             0.40 mg/dL                             AST                       28 unit/L                             ALT                       29 unit/L                             Alk Phos                  227 unit/L  HI                             Total Protein             4.8 gm/dL  LOW                             Albumin Lvl               2.4 gm/dL  LOW                             Globulin                  2.4 gm/dL                             A/G Ratio                 1.0 ratio  LOW                             Phosphorus                4.8 mg/dL  HI                             CRP High Sens             4.96 mg/L                             Prealbumin                13.8 mg/dL  LOW           Discharge Plan   Discharge Summary Plan   Discharge Status: improved.        Location: Discharge to home with hospice     Medications: See discharge medicine  reconciliation     Activity: as tolerated     Diet: Tube feedings     Instructions:  Take all medications as prescribed.          Attend appointments as scheduled.          Return to ED if symptoms worsen, or if t > 100.4.     Education:  Acute hypercapnic respiratory failure.  Sepsis.  Bacteremia.  Failure to thrive     Follow-up: To follow-up with hospice on upon admission home    Discussed plan of care, and patient communicated understanding. Agreed to comply with recommendations.    Discharge Time: 46 minutes

## 2022-05-01 NOTE — CONSULTS
DATE OF CONSULTATION:  11/05/2020    ATTENDING PHYSICIAN:  Shelton Orozco MD  CONSULTING PHYSICIAN:  Shelton Orozco MD    REASON FOR CONSULTATION:  Requesting PEG tube for dysphagia and total self-care deficit.    HISTORY OF PRESENT ILLNESS:  This is an 85-year-old female who presented to [facility] on 13 October 2020.  She had a fall and head injury.  She was hypotensive.  She was a level 1 trauma at the time.  She had an unremarkable SEP.  She has been recuperating in the [facility] __________ LTAC unit.  She did not regain sufficient strength to live independently.  She has failed barium swallow studies and Speech evaluation and is found to be very high risk for aspiration and is unable to maintain adequate p.o. diet.  For this reason, I was consulted for PEG tube placement.    ALLERGIES:  She has no known allergies.    MEDICATIONS:  Please see med reconciliation.    PHYSICAL EXAM:  VITAL SIGNS: 36.4 degrees centigrade, 87 beats per minute, 18 breaths per minute, 105/ __________ is the blood pressure, and oxygen saturation 96%.   GENERAL:  She is in no distress.  She is awake, but did not appear to be alert or conversant with me.   HEENT:  She is normocephalic, atraumatic.   NECK:  Soft, nontender.  No jugular veins distentions.   LUNGS:  Clear to auscultation bilaterally.   HEART:  Regular rate and rhythm.  No murmurs.   ABDOMEN:  Soft, nontender, nondistended.  Bowel sounds are present.    IMPRESSION AND PLAN:  85-year-old female with total self-care deficit and dysphagia and aspiration risk.  I will be happy to perform a PEG tube placement.  I did discuss with the patient's daughter at bedside.       Thank you for the consultation.  My cell phone number is [phone].  Please do not hesitate to call me for the care of this patient or any other patient who needs general surgical care.        ______________________________  Shelton Orozco MD RA/HANG  DD:  11/05/2020  Time:  02:55PM  DT:   11/05/2020  Time:  03:27PM  Job #:  [id number]

## 2022-05-01 NOTE — OP NOTE
Patient:   Richa Guerrero             MRN: [id number]            FIN: [id number]               Age:   85 years     Sex:  Female     :  1935   Associated Diagnoses:   Oropharyngeal dysphagia; Total self-care deficit; Impaired mobility   Author:   Shelton Orozco MD      Operative Note   Operative Information   Date/ Time:  2020 14:48:00.     Procedures Performed: peg tube insertion.     Indications: 85f s/p fall, unable to eat, aspiration risk.     Preoperative Diagnosis: Oropharyngeal dysphagia (KPU77-GB R13.12), Total self-care deficit (WFW32-ZY Z73.89), Impaired mobility (ZHD65-PA Z74.09).     Postoperative Diagnosis: Oropharyngeal dysphagia (AYH08-XG R13.12), Total self-care deficit (IMR31-QG Z73.89), Impaired mobility (KHJ68-WX Z74.09).     Surgeon: Shelton Orozco MD.     Anesthesia: mac.     Speciman Removed: none.     Description of Procedure/Findings/    Complications:     patient was brought to OR and laid supine     abdomen was prepped and draped in the usual sterile fashion    The  pull Percutaneous endoscopic Gastrostomy tube was used for this case    endoscope  was used to insufflate the stomach,     my assistant pressed on the abdominal wall to find where the stomach could be safely punctured via the endoscopic view    a #11 blade was used to make a 7mm skin incision    a large bore introducer needle and catheter was advanced through the incision and entered the lumen on the stomach as seen via gastroscope    a gentle guide wire was introduced into the stomach,     and endoscopic snare was used to grasp the guide wire    the guide wire was pulled from the stomach and out past the patients mouth    the cook PEG tube was passed through the patients mouth over the guide wire untill the hub of the peg tube was snug against the body of the stomach and clearly seen on vie endoscopic view    the endoscope was removed and stomach deflated    patient then taken back to pacu in  stable condition.     Esimated blood loss: No blood loss.

## 2022-05-01 NOTE — H&P
Patient:   Richa Guerrero             MRN: [id number]            FIN: [id number]               Age:   85 years     Sex:  Female     :  1935   Associated Diagnoses:   None   Author:   Jerod Armstrong      Date of Service: 10/20/2020       Chief Complaint   Large anterior abdominal hematoma s/p fall on Xarelto at home with hospital course complicated by RADHA on CKD requiring aggressive IVF resuscitation      History of Present Illness   85-year-old white female presented to [facility] ED on 10/13/2020 via air med after being transferred to [facility] as a level trauma 1 due to a trip and fall with head injury on Xarelto with hypotension on arrival.  She reported headaches and dizziness, but denied loss of consciousness, vomiting, or blurry vision.  Reportedly received 500 mL PRBC in route to ED.  PMH significant for DM type II, HTN, and atrial fib on Xarelto.  CT head unremarkable.  CT maxillofacial unremarkable for acute fractures.  She did have a right periorbital hematoma.  CT abdomen/pelvis/thorax significant for a large anterior abdominal hematomaconsistent for intramuscular hematomas. Significant for bilateral pleural effusions.  Tolerated 1 unit PRBC in ED and transferred to ICU per trauma protocol.  No evidence of splenic laceration.  Bleeding appeared to stop and clear liquid diet initiated on 10/14.  Reported confusion and delirium on 10/14.  Staff reported she pulled out 2 IVs and was placed in restraints.  Reported only 81 mL urine output over 24-hour period.  1 L IVF initiated.  Cipro initiated on 10/14.  Urine culture grew out Klebsiella pneumoniae greater than 100,000 CFU on 10/15.  Cystogram unremarkable for bladder abnormalities.  Renal indices continue to worsen.  Creatinine 3.1 on 10/16.  Renal indices and urine improved due to fluid resuscitation.  Fluids initiated via NG tube feedings on 10/17.  Urine output continues to improve.  Sodium bicarb 1300 mg 3 times  daily initiated 2/2 metabolic acidosis.  Xarelto restarted on 10/18.  MBS completed on 10/19 significant for a small Zenker's diverticulum.  ENT recommended continued speech therapy and safe for oral intake if she continues to progress.  Renal indices continue to improve.  Completed Cipro on 10/20.  Tolerated transfer to [facility] inpatient LT[facility] on 10/20 without incident.    Lying comfortably in bed.  Cognition appears to be at baseline.  Reports improved sleep hygiene.  Last BM 10/19.  Tolerating tube feedings via NG tube.  Vital signs at goal.  Bicarb 24trending up.  BUN and creatinine 82.1/2.45improving.  Phosphorus 4.2trending down.  H&H stabletrending up.  Recent imaging reported below.      Histories   Past Medical History: Large abdominal wall hematoma, ATN/CKD stage III, HTN, Normocytic anemia , Atrial fib on Xarelto, Oropharyngeal dysphagia, GERD   Procedure history: Tonsils   Family History: Mother: Cardiovascular disease + committed suicide at age 62, Father: CVA +  in his 60s   Social History     (-) TOB.     (-) ETOH.     (-) Illicit drug use.     Completed high school.  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  .   since .  Currently lives alone. [place], [place].  Independent at home.  Mobilizes around the home.  Children and sister with transportation..     Prior level of function: Independent with ADLs.  Does not drive.  Reports she does not use her walker.  Cleans, cooks, does chores.  Manages medications.  Hires someone for lawn maintenance  Residence: Lives alone in [place], [place] in a single-story home with 2-3 steps to gain entry with railing on the left side.  Has tub/shower with shower chair and hand-held shower.  No elevated toilet  DME: Walker, wheelchair, cane, hand-held shower, shower chair  Anticipated discharge destination: Home with home health      Review of Systems   Complete 12-point review of systems negative except for HPI       Health Status   Allergies:    Allergic Reactions (Selected)  No Known Allergies   Current medications:  (Selected)   Documented Medications  Documented  Colace 100 mg oral capsule: 100 mg = 1 cap(s), Oral, BID, 0 Refill(s)  Gas Relief Extra Strength: 0 Refill(s)  Haldol 5 mg/mL injectable solution: 5 mg = 1 mL, IM, q4hr, PRN PRN agitation, 0 Refill(s)  Xarelto 15mg Tablet: 15 mg = 1 tab(s), Oral, qPM  aliskiren 300 mg oral tablet: 300 mg = 1 tab(s), Oral, Daily  carvedilol 25 mg oral tablet: 25 mg = 1 tab(s), Oral, BID  ciprofloxacin 400 mg/200 mL-D5% intravenous solution: 400 mg = 200 mL, IV Piggyback, Daily, 0 Refill(s)  ezetimibe 10 mg oral tablet: 10 mg = 1 tab(s), Oral, Daily  hydroCHLOROthiazide 12.5 mg oral capsule: 12.5 mg = 1 cap(s), Oral, Daily  omeprazole 20 mg oral DR capsule: 20 mg = 1 cap(s), Oral, Daily  ondansetron: 4 mg = 2 mL, IV Push, q4hr, PRN PRN nausea/vomiting, 0 Refill(s)  sodium bicarbonate 650 mg oral tablet: 1,300 mg = 2 tab(s), Oral, TID, 0 Refill(s)      Physical Examination      Vital Signs (last 24 hrs)_____  Last Charted___________  Temp Oral         36.6 DegC  (OCT 20 16:00)  Heart Rate Peripheral   81 bpm  (OCT 20 16:00)  Resp Rate             14 br/min  (OCT 20 16:00)  SBP      112 mmHg  (OCT 20 16:00)  DBP      L 56mmHg  (OCT 20 16:00)  SpO2      98 %  (OCT 20 16:00)     General:  Alert and oriented.    Eye:  Pupils are equal, round and reactive to light, Right periorbital hematoma.    HENT:  Normocephalic.    Neck:  Supple.    Respiratory:  Lungs are clear to auscultation, Symmetrical chest wall expansion, No chest wall tenderness.    Cardiovascular:  Normal rate, Irregular rhythm, Bilateral lower extremity 2+ edema.    Gastrointestinal:  Soft, Non-tender, Obese-hypoactive bowel sounds.    Musculoskeletal:  Generalized weakness.    Integumentary:  Warm, Dry, Scattered ecchymosis.    Neurologic:  Alert, Oriented, Cranial Nerves II-XII are grossly intact.    Cognition and Speech:   Oriented, Speech clear and coherent, Functional cognition intact.    Psychiatric:  Cooperative, Appropriate mood & affect, Normal judgment, Non-suicidal.       Review / Management   Laboratory Results   Today's Lab Results : PowerNote Discrete Results   10/20/2020 1:59 CDT      WBC                       11.2 x10(3)/mcL                             RBC                       3.31 x10(6)/mcL  LOW                             Hgb                       9.7 gm/dL  LOW                             Hct                       31.1 %  LOW                             Platelet                  205 x10(3)/mcL                             MCV                       94.0 fL                             MCH                       29.3 pg                             MCHC                      31.2 gm/dL  LOW                             RDW                       17.6 %  HI                             MPV                       10.6 fL                             Abs Neut                  9.21 x10(3)/mcL  HI                             Neutro Auto               82 %  NA                             Lymph Auto                6 %  NA                             Mono Auto                 9 %  NA                             Eos Auto                  2 %  NA                             Abs Eos                   0.2 x10(3)/mcL                             Basophil Auto             0 %  NA                             Abs Neutro                9.21 x10(3)/mcL  HI                             Abs Lymph                 0.6 x10(3)/mcL                             Abs Mono                  1.0 x10(3)/mcL                             Abs Baso                  0.0 x10(3)/mcL                             Sodium Lvl                143 mmol/L                             Potassium Lvl             4.2 mmol/L                             Chloride                  108 mmol/L  HI                             CO2                       24 mmol/L                              Calcium Lvl               7.3 mg/dL  LOW                             Magnesium Lvl             2.20 mg/dL                             Glucose Lvl               152 mg/dL  HI                             BUN                       82.1 mg/dL  HI                             Creatinine                2.45 mg/dL  HI                             eGFR-AA                   24  NA                             eGFR-CLIFF                  20  NA                             Albumin Lvl               2.8 gm/dL  LOW                             Phosphorus                4.2 mg/dL        Radiology results   CT, Without contrast, C-spine , Reported at  10/13/2020 18:08:00, Reveals no acute disease process   Radiology results   CT, With contrast, Abdomen/pelvis/thorax , Reported at  10/13/2020 18:28:00, Interpretation:  Bilateral pleural effusions and lower lung zones atelectasis.  Lower abdomen extending into the pelvis along the anterior abdominal wall large hematoma surrounded by inflammations. Additional thickening and inflammation of the lower anterior abdominal wall consistent with intramuscular hematomas. Splenic images degraded by artifacts without exclusion of small lacerations. Delayed images were not acquired   Radiology results   X-ray, Cystogram , Reported at  10/15/2020 16:46:00, Reveals no acute disease process   Radiology results   CT, Head , Reported at  10/16/2020 12:42:00, Reveals no acute disease process      Impression and Plan   86 yo WF admitted on 10/20/2020    Large abdominal wall hematoma   - 2/2 Traumatic fall  - stable  - continue to monitor    ATN/CKD III  - improving  - Baseline creatinine 1.3  - 2/2 fluid depletion  - Improved with IVF resuscitation  - continue    H2O flush 50 mL/every 2 hour    HTN  - at goal!!  - continue    Coreg 12.5 mg twice daily    Hydralazine 10 mg every 2 hours as needed for BP > 160/90    Labetalol 10 mg every 2 hours as needed    Klebsiella UTI  - resolving  - s/p Cipro  10/1410/20    Normocytic anemia   - asymptomatic  - s/p transfusion    x 1 units PRBC on 10/13/2020  - H/H stable with no evidence of active bleeds  - will closely monitor and transfuse if needed     Atrial fib  - rate controlled  - continue    Xarelto 15 mg daily    Oropharyngeal dysphagia  - current  - ST to evaluate and treat  - continue    Nepro RTH 45 mL/HR    H2O 50 mL every 2 hour    Metabolic acidosis  - Improving  - Decrease    Sodium bicarb 1300 mg twice daily (decreased 10/20)    Constipation  - Last BM 10/19  - Continue    Colace 100 mg twice daily    GERD  - Avoid spicy foods, and nothing to eat or drink within x2 hours of bedtime or laying flat (water is ok)   - Avoid NSAIDs (Advil, ibuprofen, naproxen...) and grubbs-2 inhibitors (Mobic, Celebrex)    - continue    Omeprazole 20 mg daily    Delirium  - improved  - Initiate    Trazodone 25 mg at bedtime (initiated 10/20)    AB therapy  Cipro 500 mg daily 10/14present    VTE Prophylaxis: Xarelto 15 mg daily    POA: No  Living will: No  Contacts:  Tracee Guerrero (daughter) [phone]    CODE STATUS: FULL CODE  Internal Medicine (attending): Maurilio Bedoya MD    OUTPATIENT PROVIDERS  PCP: George Tellez MD  Cardiology: Nomi Muñoz MD    DISPOSITION: Condition stable.  Tolerated transfer.  Recent labs and imaging reviewed.  LTAC admission orders initiated.  Med reconciliation completed.   PT/OT/ST to eval and treat.  Obtain admission lab work in the morning.  Initiate trazodone 25 mg at bedtime for sleep hygiene and delirium.  Appears to be slowly improving.  Monitor closely.  Notify MD of acute changes.    Total time spent on this encounter including chart review and direct 1-on-1 patient interaction: 126 minutes   Over 50% of this time was spent in counseling and coordination of care